# Patient Record
Sex: FEMALE | Race: WHITE | NOT HISPANIC OR LATINO | Employment: OTHER | URBAN - METROPOLITAN AREA
[De-identification: names, ages, dates, MRNs, and addresses within clinical notes are randomized per-mention and may not be internally consistent; named-entity substitution may affect disease eponyms.]

---

## 2017-05-03 ENCOUNTER — PREPPED CHART (OUTPATIENT)
Dept: URBAN - METROPOLITAN AREA CLINIC 36 | Facility: CLINIC | Age: 72
End: 2017-05-03

## 2018-05-18 ASSESSMENT — VISUAL ACUITY
OS_SC: 20/40
OD_CC: J1
OS_CC: 20/25
OD_SC: 20/50
OS_CC: J1
OS_SC: J3
OD_SC: J2
OD_CC: 20/20

## 2018-05-18 ASSESSMENT — TONOMETRY
OD_IOP_MMHG: 20
OS_IOP_MMHG: 20

## 2018-07-10 ENCOUNTER — ESTABLISHED COMPREHENSIVE EXAM (OUTPATIENT)
Dept: URBAN - METROPOLITAN AREA CLINIC 36 | Facility: CLINIC | Age: 73
End: 2018-07-10

## 2018-07-10 DIAGNOSIS — H25.813: ICD-10-CM

## 2018-07-10 DIAGNOSIS — H04.123: ICD-10-CM

## 2018-07-10 DIAGNOSIS — I10: ICD-10-CM

## 2018-07-10 PROCEDURE — 92014 COMPRE OPH EXAM EST PT 1/>: CPT

## 2018-07-10 PROCEDURE — 92015 DETERMINE REFRACTIVE STATE: CPT

## 2018-07-10 ASSESSMENT — VISUAL ACUITY
OS_SC: J2
OD_SC: J3
OD_CC: J1+
OS_SC: 20/40
OS_CC: 20/25+2
OD_CC: 20/20-1
OD_SC: 20/40+1
OS_CC: J1+

## 2018-07-10 ASSESSMENT — TONOMETRY
OD_IOP_MMHG: 16
OS_IOP_MMHG: 16

## 2019-08-06 ENCOUNTER — ESTABLISHED COMPREHENSIVE EXAM (OUTPATIENT)
Dept: URBAN - METROPOLITAN AREA CLINIC 36 | Facility: CLINIC | Age: 74
End: 2019-08-06

## 2019-08-06 DIAGNOSIS — H25.812: ICD-10-CM

## 2019-08-06 DIAGNOSIS — H52.7: ICD-10-CM

## 2019-08-06 DIAGNOSIS — H04.123: ICD-10-CM

## 2019-08-06 DIAGNOSIS — H25.811: ICD-10-CM

## 2019-08-06 PROCEDURE — 92015 DETERMINE REFRACTIVE STATE: CPT

## 2019-08-06 PROCEDURE — 92014 COMPRE OPH EXAM EST PT 1/>: CPT

## 2019-08-06 ASSESSMENT — TONOMETRY
OS_IOP_MMHG: 18
OD_IOP_MMHG: 18

## 2019-08-06 ASSESSMENT — VISUAL ACUITY
OD_SC: J3
OS_CC: 20/20-2
OS_SC: J3
OD_CC: 20/25+1
OS_SC: 20/40
OD_CC: J1
OS_CC: J2
OD_SC: 20/50+2

## 2021-09-16 ENCOUNTER — ESTABLISHED COMPREHENSIVE EXAM (OUTPATIENT)
Dept: URBAN - METROPOLITAN AREA CLINIC 36 | Facility: CLINIC | Age: 76
End: 2021-09-16

## 2021-09-16 DIAGNOSIS — H52.7: ICD-10-CM

## 2021-09-16 DIAGNOSIS — H04.123: ICD-10-CM

## 2021-09-16 DIAGNOSIS — H25.813: ICD-10-CM

## 2021-09-16 PROCEDURE — 92014 COMPRE OPH EXAM EST PT 1/>: CPT

## 2021-09-16 PROCEDURE — 92015 DETERMINE REFRACTIVE STATE: CPT

## 2021-09-16 ASSESSMENT — VISUAL ACUITY
OD_CC: 20/25
OS_CC: J1
OD_SC: 20/70-1
OD_CC: J1
OS_SC: 20/80
OS_SC: J3
OS_CC: 20/30+2
OD_SC: J3

## 2021-09-16 ASSESSMENT — TONOMETRY
OD_IOP_MMHG: 18
OS_IOP_MMHG: 19

## 2022-02-22 NOTE — PATIENT DISCUSSION
Continue AREDS 2 supplements, monitoring [employer] Grid, eat healthy, and wear sunglasses. Call with any vision or [place]ms[place] changes.

## 2022-02-22 NOTE — PATIENT DISCUSSION
Monitor for macular changes with visits and Mac OCTs. Call with any vision changes or increased symptoms.

## 2022-09-20 ENCOUNTER — COMPREHENSIVE EXAM (OUTPATIENT)
Dept: URBAN - METROPOLITAN AREA CLINIC 36 | Facility: CLINIC | Age: 77
End: 2022-09-20

## 2022-09-20 DIAGNOSIS — H52.7: ICD-10-CM

## 2022-09-20 DIAGNOSIS — H25.813: ICD-10-CM

## 2022-09-20 DIAGNOSIS — H04.123: ICD-10-CM

## 2022-09-20 PROCEDURE — 92015 DETERMINE REFRACTIVE STATE: CPT

## 2022-09-20 PROCEDURE — 92014 COMPRE OPH EXAM EST PT 1/>: CPT

## 2022-09-20 ASSESSMENT — VISUAL ACUITY
OD_CC: 20/50+1
OD_SC: 20/80-1
OD_CC: J1
OD_PH: 20/30+2
OS_SC: 20/80-1
OS_SC: J3
OD_SC: J3
OS_CC: 20/30+2
OS_CC: J1

## 2022-09-20 ASSESSMENT — TONOMETRY
OS_IOP_MMHG: 20
OD_IOP_MMHG: 19

## 2023-09-21 ENCOUNTER — COMPREHENSIVE EXAM (OUTPATIENT)
Dept: URBAN - METROPOLITAN AREA CLINIC 36 | Facility: CLINIC | Age: 78
End: 2023-09-21

## 2023-09-21 DIAGNOSIS — H04.123: ICD-10-CM

## 2023-09-21 DIAGNOSIS — Z79.899: ICD-10-CM

## 2023-09-21 DIAGNOSIS — M06.9: ICD-10-CM

## 2023-09-21 DIAGNOSIS — H25.813: ICD-10-CM

## 2023-09-21 PROCEDURE — 92134 CPTRZ OPH DX IMG PST SGM RTA: CPT

## 2023-09-21 PROCEDURE — 92015 DETERMINE REFRACTIVE STATE: CPT

## 2023-09-21 PROCEDURE — 92014 COMPRE OPH EXAM EST PT 1/>: CPT

## 2023-09-21 ASSESSMENT — VISUAL ACUITY
OD_SC: J2
OS_SC: 20/200+1
OD_PH: 20/50+2
OD_CC: J1
OD_SC: 20/100
OS_CC: 20/50-2
OS_SC: J2
OD_CC: 20/70-1
OS_CC: J1

## 2024-11-16 ENCOUNTER — COMPREHENSIVE EXAM (OUTPATIENT)
Dept: URBAN - METROPOLITAN AREA CLINIC 36 | Facility: CLINIC | Age: 79
End: 2024-11-16

## 2024-11-16 DIAGNOSIS — M06.9: ICD-10-CM

## 2024-11-16 DIAGNOSIS — H25.813: ICD-10-CM

## 2024-11-16 DIAGNOSIS — Z79.899: ICD-10-CM

## 2024-11-16 DIAGNOSIS — H04.123: ICD-10-CM

## 2024-11-16 DIAGNOSIS — H52.7: ICD-10-CM

## 2024-11-16 PROCEDURE — 92014 COMPRE OPH EXAM EST PT 1/>: CPT

## 2024-11-16 PROCEDURE — 92134 CPTRZ OPH DX IMG PST SGM RTA: CPT

## 2024-11-16 PROCEDURE — 92015 DETERMINE REFRACTIVE STATE: CPT
